# Patient Record
Sex: FEMALE | URBAN - METROPOLITAN AREA
[De-identification: names, ages, dates, MRNs, and addresses within clinical notes are randomized per-mention and may not be internally consistent; named-entity substitution may affect disease eponyms.]

---

## 2020-09-04 ENCOUNTER — OFFICE VISIT (OUTPATIENT)
Dept: URBAN - METROPOLITAN AREA TELEHEALTH 2 | Facility: TELEHEALTH | Age: 45
End: 2020-09-04
Payer: COMMERCIAL

## 2020-09-04 VITALS — HEIGHT: 61 IN | BODY MASS INDEX: 26.43 KG/M2 | WEIGHT: 140 LBS

## 2020-09-04 DIAGNOSIS — R19.4 CHANGE IN BOWEL HABIT: ICD-10-CM

## 2020-09-04 DIAGNOSIS — Z12.11 COLON CANCER SCREENING: ICD-10-CM

## 2020-09-04 PROCEDURE — G9903 PT SCRN TBCO ID AS NON USER: HCPCS | Performed by: INTERNAL MEDICINE

## 2020-09-04 PROCEDURE — 99204 OFFICE O/P NEW MOD 45 MIN: CPT | Performed by: INTERNAL MEDICINE

## 2020-09-04 PROCEDURE — G8427 DOCREV CUR MEDS BY ELIG CLIN: HCPCS | Performed by: INTERNAL MEDICINE

## 2020-09-04 PROCEDURE — 1036F TOBACCO NON-USER: CPT | Performed by: INTERNAL MEDICINE

## 2020-09-04 PROCEDURE — G8420 CALC BMI NORM PARAMETERS: HCPCS | Performed by: INTERNAL MEDICINE

## 2020-09-04 NOTE — HPI-TODAY'S VISIT:
Patient seen today via telehealth by agreement and consent of patient in light of current COVID-19 pandemic. I used video conferencing during the visit. The patient encounter is appropriate and reasonable under the circumstances given the patient's particular presentation at this time. The patient has been advised of the followin) the potential risks and limitations of this mode of treatment (including but not limited to the absence of in-person examination); 2) the right to refuse telehealth services at any point without affecting the right to future care; 3) the right to receive in-person services, included immediately after this consultation if an urgent need arises; 4) information, including identifiable images or information from this telehealth consult, will only be shared in accordance with HIPAA regulations. Any and all of the patient's and/or patient's family member's questions on this issue have been answered. The patient has verbally consented to be treated via telehealth services. The patient has also been advised to contact this office for worsening conditions or problems, and seek emergency medical treatment and/or call 911 if the patient deems either necessary.    Ms. Sprague is a 44 yo F presenting with change in bowel habits. She has 2-3 BMs per day up from 1 BM per day one week ago. She believes that this is related to stress. Her mother had a stroke 2 weeks ago and she cares for her 96 year old grandfather. The stools are solid to loose but never watery. There is no mucus in the stool. This has been ongoing for 1 week. She has no blood in the stools. She has no abdominal pain. She does not have any cramping or bloating. She has not had any weight loss. She has the stools early in the morning or in the evening. She has only had 1 BM mid-day. She is not having to wake up in the middle of the night with bowel movements. She has had no fever or chills, no sick contacts or recent travel. She has had no changes in diet and she is taking no medications, herbs or supplements. She has been vegan for the last 6 months. She has never had a colonoscopy before.

## 2020-09-14 ENCOUNTER — OFFICE VISIT (OUTPATIENT)
Dept: URBAN - METROPOLITAN AREA SURGERY CENTER 18 | Facility: SURGERY CENTER | Age: 45
End: 2020-09-14
Payer: COMMERCIAL

## 2020-09-14 DIAGNOSIS — R19.4 ALTERED BOWEL HABITS: ICD-10-CM

## 2020-09-14 PROCEDURE — G9935 CANC NOT DETECTD DURING SRCN: HCPCS | Performed by: INTERNAL MEDICINE

## 2020-09-14 PROCEDURE — G8907 PT DOC NO EVENTS ON DISCHARG: HCPCS | Performed by: INTERNAL MEDICINE

## 2020-09-14 PROCEDURE — G9937 DIG OR SURV COLSCO: HCPCS | Performed by: INTERNAL MEDICINE

## 2020-09-14 PROCEDURE — 45378 DIAGNOSTIC COLONOSCOPY: CPT | Performed by: INTERNAL MEDICINE

## 2020-09-15 ENCOUNTER — OFFICE VISIT (OUTPATIENT)
Dept: URBAN - METROPOLITAN AREA CLINIC 98 | Facility: CLINIC | Age: 45
End: 2020-09-15

## 2020-09-16 ENCOUNTER — TELEPHONE ENCOUNTER (OUTPATIENT)
Dept: URBAN - METROPOLITAN AREA CLINIC 98 | Facility: CLINIC | Age: 45
End: 2020-09-16

## 2020-12-03 ENCOUNTER — OFFICE VISIT (OUTPATIENT)
Dept: URBAN - METROPOLITAN AREA CLINIC 98 | Facility: CLINIC | Age: 45
End: 2020-12-03

## 2020-12-03 NOTE — HPI-TODAY'S VISIT:
Ms. Sprague is a 46 yo F presenting with change in bowel habits. She had a colonoscopy on 9/14/2020 with internal hemorrhoids. Due to suboptimal prep I suggest a 5 year follow up colonoscopy.  9/4/2020: Ms. Sprague is a 46 yo F presenting with change in bowel habits. She has 2-3 BMs per day up from 1 BM per day one week ago. She believes that this is related to stress. Her mother had a stroke 2 weeks ago and she cares for her 96 year old grandfather. The stools are solid to loose but never watery. There is no mucus in the stool. This has been ongoing for 1 week. She has no blood in the stools. She has no abdominal pain. She does not have any cramping or bloating. She has not had any weight loss. She has the stools early in the morning or in the evening. She has only had 1 BM mid-day. She is not having to wake up in the middle of the night with bowel movements. She has had no fever or chills, no sick contacts or recent travel. She has had no changes in diet and she is taking no medications, herbs or supplements. She has been vegan for the last 6 months. She has never had a colonoscopy before.

## 2020-12-11 ENCOUNTER — OFFICE VISIT (OUTPATIENT)
Dept: URBAN - METROPOLITAN AREA TELEHEALTH 2 | Facility: TELEHEALTH | Age: 45
End: 2020-12-11
Payer: COMMERCIAL

## 2020-12-11 VITALS — BODY MASS INDEX: 26.62 KG/M2 | HEIGHT: 61 IN | WEIGHT: 141 LBS

## 2020-12-11 DIAGNOSIS — R10.84 ABDOMINAL CRAMPING, GENERALIZED: ICD-10-CM

## 2020-12-11 DIAGNOSIS — R19.4 CHANGE IN BOWEL HABITS: ICD-10-CM

## 2020-12-11 PROCEDURE — G9903 PT SCRN TBCO ID AS NON USER: HCPCS | Performed by: INTERNAL MEDICINE

## 2020-12-11 PROCEDURE — G8420 CALC BMI NORM PARAMETERS: HCPCS | Performed by: INTERNAL MEDICINE

## 2020-12-11 PROCEDURE — G8483 FLU IMM NO ADMIN DOC REA: HCPCS | Performed by: INTERNAL MEDICINE

## 2020-12-11 PROCEDURE — 1036F TOBACCO NON-USER: CPT | Performed by: INTERNAL MEDICINE

## 2020-12-11 PROCEDURE — 99214 OFFICE O/P EST MOD 30 MIN: CPT | Performed by: INTERNAL MEDICINE

## 2020-12-11 PROCEDURE — G8427 DOCREV CUR MEDS BY ELIG CLIN: HCPCS | Performed by: INTERNAL MEDICINE

## 2020-12-11 NOTE — HPI-TODAY'S VISIT:
Ms. Sprague is a 44 yo F presenting with change in bowel habits. She had a colonoscopy on 2020 with internal hemorrhoids. Due to suboptimal prep I suggest a 5 year follow up colonoscopy. Her abdominal discomfort is better recently. Her stress level is improved. She is having about 1-2 BMs per day that are solid. No blood in the stools. She is no longer vegan at this time.  2020: Ms. Sprague is a 44 yo F presenting with change in bowel habits. She has 2-3 BMs per day up from 1 BM per day one week ago. She believes that this is related to stress. Her mother had a stroke 2 weeks ago and she cares for her 96 year old grandfather. The stools are solid to loose but never watery. There is no mucus in the stool. This has been ongoing for 1 week. She has no blood in the stools. She has no abdominal pain. She does not have any cramping or bloating. She has not had any weight loss. She has the stools early in the morning or in the evening. She has only had 1 BM mid-day. She is not having to wake up in the middle of the night with bowel movements. She has had no fever or chills, no sick contacts or recent travel. She has had no changes in diet and she is taking no medications, herbs or supplements. She has been vegan for the last 6 months. She has never had a colonoscopy before.   Patient seen today via telehealth by agreement and consent of patient in light of current COVID-19 pandemic. I used video conferencing during the visit. The patient encounter is appropriate and reasonable under the circumstances given the patient's particular presentation at this time. The patient has been advised of the followin) the potential risks and limitations of this mode of treatment (including but not limited to the absence of in-person examination); 2) the right to refuse telehealth services at any point without affecting the right to future care; 3) the right to receive in-person services, included immediately after this consultation if an urgent need arises; 4) information, including identifiable images or information from this telehealth consult, will only be shared in accordance with HIPPA regulations. Any and all of the patient's and/or patient's family member's questions on this issue have been answered. The patient has verbally consented to be treated via telehealth services. The patient has also been advised to contact this office for worsening conditions or problems, and seek emergency medical treatment and/or call 911 if the patient deems either necessary.   More than half of the face-to-face time used for counseling and coordination of care.

## 2020-12-11 NOTE — EXAM-FUNCTIONAL ASSESSMENT
Constitutional: well-developed, normal communication ability.   Eyes: Conjunctivae and eyelids appear normal, no scleral icterus.   Nose/nasopharynx, external nose: appear normal, normal appearance of lips.   Neck/thyroid: normal appearance   Chest: No visualized lesions or deformities.   Gastrointestinal: No scars, no tenderness on self palpation   Musculoskeletal: no visualized joint erythema or swelling   Skin: no rashes or jaundice   Neurologic: Oriented to person, place, time.    Psychiatric: Normal mood and appropriate affect.

## 2021-10-15 ENCOUNTER — OFFICE VISIT (OUTPATIENT)
Dept: URBAN - METROPOLITAN AREA TELEHEALTH 2 | Facility: TELEHEALTH | Age: 46
End: 2021-10-15

## 2022-07-21 ENCOUNTER — TELEPHONE ENCOUNTER (OUTPATIENT)
Dept: URBAN - METROPOLITAN AREA TELEHEALTH 2 | Facility: TELEHEALTH | Age: 47
End: 2022-07-21

## 2022-07-21 RX ORDER — OMEPRAZOLE 40 MG/1
1 CAPSULE 30 MINUTES BEFORE MORNING MEAL CAPSULE, DELAYED RELEASE ORAL ONCE A DAY
Qty: 30 | OUTPATIENT
Start: 2022-07-21

## 2022-09-02 ENCOUNTER — DASHBOARD ENCOUNTERS (OUTPATIENT)
Age: 47
End: 2022-09-02

## 2022-09-14 ENCOUNTER — OFFICE VISIT (OUTPATIENT)
Dept: URBAN - METROPOLITAN AREA TELEHEALTH 2 | Facility: TELEHEALTH | Age: 47
End: 2022-09-14
Payer: COMMERCIAL

## 2022-09-14 ENCOUNTER — TELEPHONE ENCOUNTER (OUTPATIENT)
Dept: URBAN - METROPOLITAN AREA CLINIC 92 | Facility: CLINIC | Age: 47
End: 2022-09-14

## 2022-09-14 VITALS — HEIGHT: 61 IN | WEIGHT: 141 LBS | BODY MASS INDEX: 26.62 KG/M2

## 2022-09-14 DIAGNOSIS — R10.11 RUQ ABDOMINAL PAIN: ICD-10-CM

## 2022-09-14 PROCEDURE — 99214 OFFICE O/P EST MOD 30 MIN: CPT | Performed by: INTERNAL MEDICINE

## 2022-09-14 RX ORDER — OMEPRAZOLE 40 MG/1
1 CAPSULE 30 MINUTES BEFORE MORNING MEAL CAPSULE, DELAYED RELEASE ORAL ONCE A DAY
Qty: 30 | Status: ACTIVE | COMMUNITY
Start: 2022-07-21

## 2022-09-14 NOTE — HPI-OTHER HISTORIES
(December 2020) Ms. Sprague is a 44 yo F presenting with change in bowel habits. She had a colonoscopy on 9/14/2020 with internal hemorrhoids. Due to suboptimal prep I suggest a 5 year follow up colonoscopy. Her abdominal discomfort is better recently. Her stress level is improved. She is having about 1-2 BMs per day that are solid. No blood in the stools. She is no longer vegan at this time.  (9/4/2020) Ms. Sprague is a 44 yo F presenting with change in bowel habits. She has 2-3 BMs per day up from 1 BM per day one week ago. She believes that this is related to stress. Her mother had a stroke 2 weeks ago and she cares for her 96 year old grandfather. The stools are solid to loose but never watery. There is no mucus in the stool. This has been ongoing for 1 week. She has no blood in the stools. She has no abdominal pain. She does not have any cramping or bloating. She has not had any weight loss. She has the stools early in the morning or in the evening. She has only had 1 BM mid-day. She is not having to wake up in the middle of the night with bowel movements. She has had no fever or chills, no sick contacts or recent travel. She has had no changes in diet and she is taking no medications, herbs or supplements. She has been vegan for the last 6 months. She has never had a colonoscopy before.

## 2022-09-14 NOTE — HPI-TODAY'S VISIT:
Ms. Sprague is a 44 yo F presenting with significant belching.  She also reports abdominal pain and cramping in thr RUQ which developed one month ago in assocation with nausea and vomiting.  The pain was so severe that she went to the [facility] ER.  She was diagnosed with an ovarian cyst.  She has seen a GYN who states the cyst has dissolved.  A colonoscopy 2021 demonstrated intermal hemorrhoids butwas otherwise unremarkable.  Omeprazole was called in for the pain and she was emailed al handout on foods that can cause bloating.  She is no longer on omeprazole.  Symptoms have resolved.  She would like to know what caused her RUQ pain.

## 2022-09-23 ENCOUNTER — TELEPHONE ENCOUNTER (OUTPATIENT)
Dept: URBAN - METROPOLITAN AREA CLINIC 17 | Facility: CLINIC | Age: 47
End: 2022-09-23